# Patient Record
Sex: MALE | Race: WHITE
[De-identification: names, ages, dates, MRNs, and addresses within clinical notes are randomized per-mention and may not be internally consistent; named-entity substitution may affect disease eponyms.]

---

## 2018-06-02 ENCOUNTER — HOSPITAL ENCOUNTER (OUTPATIENT)
Dept: HOSPITAL 56 - MW.ED | Age: 83
Setting detail: OBSERVATION
LOS: 1 days | Discharge: HOME | End: 2018-06-03
Attending: INTERNAL MEDICINE | Admitting: INTERNAL MEDICINE
Payer: MEDICARE

## 2018-06-02 DIAGNOSIS — Z95.5: ICD-10-CM

## 2018-06-02 DIAGNOSIS — R07.9: Primary | ICD-10-CM

## 2018-06-02 DIAGNOSIS — Z85.828: ICD-10-CM

## 2018-06-02 DIAGNOSIS — Z79.82: ICD-10-CM

## 2018-06-02 DIAGNOSIS — E11.9: ICD-10-CM

## 2018-06-02 DIAGNOSIS — Z86.73: ICD-10-CM

## 2018-06-02 DIAGNOSIS — I25.10: ICD-10-CM

## 2018-06-02 DIAGNOSIS — Z88.8: ICD-10-CM

## 2018-06-02 DIAGNOSIS — Z79.02: ICD-10-CM

## 2018-06-02 DIAGNOSIS — F03.90: ICD-10-CM

## 2018-06-02 DIAGNOSIS — Z79.899: ICD-10-CM

## 2018-06-02 DIAGNOSIS — Z79.84: ICD-10-CM

## 2018-06-02 LAB
CHLORIDE SERPL-SCNC: 105 MMOL/L (ref 98–107)
SODIUM SERPL-SCNC: 141 MMOL/L (ref 136–148)

## 2018-06-02 PROCEDURE — 80048 BASIC METABOLIC PNL TOTAL CA: CPT

## 2018-06-02 PROCEDURE — G0378 HOSPITAL OBSERVATION PER HR: HCPCS

## 2018-06-02 PROCEDURE — 93005 ELECTROCARDIOGRAM TRACING: CPT

## 2018-06-02 PROCEDURE — 96361 HYDRATE IV INFUSION ADD-ON: CPT

## 2018-06-02 PROCEDURE — 82962 GLUCOSE BLOOD TEST: CPT

## 2018-06-02 PROCEDURE — 84484 ASSAY OF TROPONIN QUANT: CPT

## 2018-06-02 PROCEDURE — 80053 COMPREHEN METABOLIC PANEL: CPT

## 2018-06-02 PROCEDURE — 99285 EMERGENCY DEPT VISIT HI MDM: CPT

## 2018-06-02 PROCEDURE — 85025 COMPLETE CBC W/AUTO DIFF WBC: CPT

## 2018-06-02 PROCEDURE — 83690 ASSAY OF LIPASE: CPT

## 2018-06-02 PROCEDURE — 82550 ASSAY OF CK (CPK): CPT

## 2018-06-02 PROCEDURE — 36415 COLL VENOUS BLD VENIPUNCTURE: CPT

## 2018-06-02 PROCEDURE — 71045 X-RAY EXAM CHEST 1 VIEW: CPT

## 2018-06-02 PROCEDURE — 82553 CREATINE MB FRACTION: CPT

## 2018-06-02 PROCEDURE — 96374 THER/PROPH/DIAG INJ IV PUSH: CPT

## 2018-06-02 PROCEDURE — 81001 URINALYSIS AUTO W/SCOPE: CPT

## 2018-06-02 RX ADMIN — METOROPROLOL TARTRATE SCH MG: 5 INJECTION, SOLUTION INTRAVENOUS at 07:34

## 2018-06-02 RX ADMIN — METOROPROLOL TARTRATE SCH: 5 INJECTION, SOLUTION INTRAVENOUS at 09:00

## 2018-06-02 RX ADMIN — METOROPROLOL TARTRATE SCH MG: 5 INJECTION, SOLUTION INTRAVENOUS at 07:43

## 2018-06-02 NOTE — EDM.PDOC
ED HPI GENERAL MEDICAL PROBLEM





- General


Chief Complaint: Chest Pain


Stated Complaint: CHEST PAIN


Time Seen by Provider: 06/02/18 07:14


Source of Information: Reports: Patient





- History of Present Illness


INITIAL COMMENTS - FREE TEXT/NARRATIVE: 





HISTORY AND PHYSICAL:


History of present illness:


[Patient with history of Alzheimer's type dementia in the early stages of this 

presents with atypical chest pain which began at 4 AM and is currently resolved 

is uncertain if he had actual pain he describes a dentist his heart was beating 

heavy he did not associate any shortness of breath dizziness or diaphoresis 

with this event no radiation to arm neck or jaw





He does have history of previous stent in 2010





No fever nausea vomiting chills sweats no chest pain shortness breath headache 

dizziness or palpitation no bowel or urine symptoms at current


]


Review of systems: 


As per history of present illness and below otherwise all systems reviewed and 

negative.


Past medical history: 


As per history of present illness and as reviewed below otherwise 

noncontributory.


Surgical history: 


As per history of present illness and as reviewed below otherwise 

noncontributory.


Social history: 


No reported history of drug or alcohol abuse.


Family history: 


As per history of present illness and as reviewed below otherwise 

noncontributory.


Physical exam:


HEENT: Atraumatic, normocephalic, pupils reactive, negative for conjunctival 

pallor or scleral icterus, mucous membranes moist, throat clear, neck supple, 

nontender, trachea midline.


Lungs: Clear to auscultation, breath sounds equal bilaterally, chest nontender.


Heart: S1S2, regular, negative for clicks, rubs, or JVD.


Abdomen: Soft, nondistended, nontender. Negative for masses or 

hepatosplenomegaly. Negative for costovertebral tenderness.


Pelvis: Stable nontender.


Genitourinary: Deferred.


Rectal: Deferred.


Extremities: Atraumatic, negative for cords or calf pain. Neurovascular 

unremarkable.


Neuro: Awake, alert, oriented. Cranial nerves II through XII unremarkable. 

Cerebellum unremarkable. Motor and sensory unremarkable throughout. Exam 

nonfocal.





Diagnostics:


[CBC CMP and cardiac enzymes lipase


EKG


Chest 1 view


]


Therapeutics:


[ liter normal saline


Aspirin 324 mg chewable


] Lopressor 5 mg IV





Impression: 


[Atypical chest pain





Definitive disposition and diagnosis as appropriate pending reevaluation and 

review of above.





- Related Data


 Allergies











Allergy/AdvReac Type Severity Reaction Status Date / Time


 


chloramphenicol sod succ Allergy Unknown Hallucinati Verified 10/16/14 00:20





[From Chloromycetin]   ons  


 


chloramphenicol Allergy  Hallucinati Verified 07/16/14 10:44





[From Chloromycetin]   ons  











Home Meds: 


 Home Meds





Glimepiride 0.5 tab PO DAILY 06/02/18 [History]


Metoprolol Tartrate [Lopressor] 50 mg PO DAILY 06/02/18 [History]


atorvaSTATin [Lipitor] 10 mg PO DAILY 06/02/18 [History]


metFORMIN [Glucophage XR] 500 mg PO BIDMEALS 06/02/18 [History]











ED ROS GENERAL





- Review of Systems


Review Of Systems: See Below





ED EXAM, GENERAL





- Physical Exam


Exam: See Below





Course





- Vital Signs


Last Recorded V/S: 


 Last Vital Signs











Temp  97.9 F   06/02/18 07:14


 


Pulse  87   06/02/18 07:34


 


Resp  18   06/02/18 07:14


 


BP  188/92 H  06/02/18 07:34


 


Pulse Ox  93 L  06/02/18 07:14














- Orders/Labs/Meds


Orders: 


 Active Orders 24 hr











 Category Date Time Status


 


 EKG Documentation Completion [RC] STAT Care  06/02/18 07:15 Active


 


 Chest 1V Frontal [CR] Stat Exams  06/02/18 07:15 Ordered


 


 UA W/MICROSCOPIC [URIN] Stat Lab  06/02/18 07:14 Ordered


 


 Sodium Chloride 0.9% [Normal Saline] 1,000 ml Med  06/02/18 07:15 Active





 IV STAT   








 Medication Orders





Sodium Chloride (Normal Saline)  1,000 mls @ 125 mls/hr IV STAT AKI


   Last Infusion: 06/02/18 07:30  Dose: 50 mls/hr


   Admin: 06/02/18 07:28  Dose: 125 mls/hr








Labs: 


 Laboratory Tests











  06/02/18 06/02/18 06/02/18 Range/Units





  07:18 07:18 07:30 


 


WBC  11.25 H    (4.0-11.0)  K/uL


 


RBC  4.75    (4.50-5.90)  M/uL


 


Hgb  13.6    (13.0-17.0)  g/dL


 


Hct  41.3    (38.0-50.0)  %


 


MCV  86.9    (80.0-98.0)  fL


 


MCH  28.6    (27.0-32.0)  pg


 


MCHC  32.9    (31.0-37.0)  g/dL


 


RDW Std Deviation  46.0    (28.0-62.0)  fl


 


RDW Coeff of Donet  15    (11.0-15.0)  %


 


Plt Count  167    (150-400)  K/uL


 


MPV  10.40    (7.40-12.00)  fL


 


Neut % (Auto)  61.0    (48.0-80.0)  %


 


Lymph % (Auto)  31.3    (16.0-40.0)  %


 


Mono % (Auto)  6.5    (0.0-15.0)  %


 


Eos % (Auto)  1.1    (0.0-7.0)  %


 


Baso % (Auto)  0.1    (0.0-1.5)  %


 


Neut # (Auto)  6.9 H    (1.4-5.7)  K/uL


 


Lymph # (Auto)  3.5 H    (0.6-2.4)  K/uL


 


Mono # (Auto)  0.7    (0.0-0.8)  K/uL


 


Eos # (Auto)  0.1    (0.0-0.7)  K/uL


 


Baso # (Auto)  0.0    (0.0-0.1)  K/uL


 


Nucleated RBC %  0.0    /100WBC


 


Nucleated RBCs #  0    K/uL


 


Sodium   141   (136-148)  mmol/L


 


Potassium   3.8   (3.5-5.1)  mmol/L


 


Chloride   105   ()  mmol/L


 


Carbon Dioxide   25.2   (21.0-32.0)  mmol/L


 


BUN   27 H   (7.0-18.0)  mg/dL


 


Creatinine   1.1   (0.8-1.3)  mg/dL


 


Est Cr Clr Drug Dosing   42.56   mL/min


 


Estimated GFR (MDRD)   > 60.0   ml/min


 


Glucose   122 H   ()  mg/dL


 


POC Glucose    116 H  ()  mg/dL


 


Calcium   9.1   (8.5-10.1)  mg/dL


 


Total Bilirubin   0.6   (0.2-1.0)  mg/dL


 


AST   20   (15-37)  IU/L


 


ALT   24   (14-63)  IU/L


 


Alkaline Phosphatase   71   ()  U/L


 


Creatine Kinase   126   ()  U/L


 


CK-MB (CK-2)   4.3 H   (0-3.6)  ng/mL


 


Troponin I   < 0.050   (0.000-0.056)  ng/mL


 


Total Protein   7.0   (6.4-8.2)  g/dL


 


Albumin   3.7   (3.4-5.0)  g/dL


 


Globulin   3.3   (2.0-3.5)  g/dL


 


Albumin/Globulin Ratio   1.1 L   (1.3-2.8)  


 


Lipase   77   ()  U/L











Meds: 


Medications











Generic Name Dose Route Start Last Admin





  Trade Name Freq  PRN Reason Stop Dose Admin


 


Sodium Chloride  1,000 mls @ 125 mls/hr  06/02/18 07:15  06/02/18 07:30





  Normal Saline  IV   50 mls/hr





  STAT AKI   Infusion





     





     





     





     














Discontinued Medications














Generic Name Dose Route Start Last Admin





  Trade Name Sheltonq  PRN Reason Stop Dose Admin


 


Aspirin  324 mg  06/02/18 07:13  06/02/18 07:25





  Aspirin  PO  06/02/18 07:14  324 mg





  ONETIME ONE   Administration





     





     





     





     


 


Metoprolol Tartrate  5 mg  06/02/18 07:30  06/02/18 07:34





  Lopressor  IVPUSH  06/02/18 07:41  5 mg





  Q5M AKI   Administration





     





     





     





     














Departure





- Departure


Time of Disposition: 08:14


Disposition: Refer to Observation


Condition: Fair


Clinical Impression: 


 Atypical chest pain








- Discharge Information


Referrals: 


Brianna Dee, NP [Primary Care Provider] - 


Forms:  ED Department Discharge





- My Orders


Last 24 Hours: 


My Active Orders





06/02/18 07:14


UA W/MICROSCOPIC [URIN] Stat 





06/02/18 07:15


EKG Documentation Completion [RC] STAT 


Chest 1V Frontal [CR] Stat 


Sodium Chloride 0.9% [Normal Saline] 1,000 ml IV STAT 














- Assessment/Plan


Last 24 Hours: 


My Active Orders





06/02/18 07:14


UA W/MICROSCOPIC [URIN] Stat 





06/02/18 07:15


EKG Documentation Completion [RC] STAT 


Chest 1V Frontal [CR] Stat 


Sodium Chloride 0.9% [Normal Saline] 1,000 ml IV STAT

## 2018-06-02 NOTE — PCM.HP
H&P History of Present Illness





- General


Admit Problem/Dx: 


 Admission Diagnosis/Problem





Admission Diagnosis/Problem      Atypical chest pain











- History of Present Illness


Initial Comments - Free Text/Narative: 





89 yo male with pmh of dementia and CAD s/p one stent.  He presented this 

morning with complaint of heart palpitations.  He was evaluated in the ED and 

EKG showed sinur rhythm with rate of 98 bpm.  His blood pressure was 188/92.  

He did received ASA 325mg and IV Lopressor 5mg.  His chest complaint has 

resolved and due to his dementia he has already forgotten about it.  Family 

that is here with him do not want any agressive measures but are willing to 

keep him here overnight.





- Related Data


Allergies/Adverse Reactions: 


 Allergies











Allergy/AdvReac Type Severity Reaction Status Date / Time


 


chloramphenicol sod succ Allergy Unknown Hallucinati Verified 10/16/14 00:20





[From Chloromycetin]   ons  


 


chloramphenicol Allergy  Hallucinati Verified 07/16/14 10:44





[From Chloromycetin]   ons  











Home Medications: 


 Home Meds





Glimepiride 0.5 tab PO DAILY 06/02/18 [History]


Metoprolol Tartrate [Lopressor] 50 mg PO DAILY 06/02/18 [History]


atorvaSTATin [Lipitor] 10 mg PO DAILY 06/02/18 [History]


metFORMIN [Glucophage XR] 500 mg PO BIDMEALS 06/02/18 [History]











Past Medical History


HEENT History: Reports: Cataract


Cardiovascular History: Reports: Stents


Genitourinary History: Reports: Other (See Below)


Other Genitourinary History: prostate surgery over 20 years ago


Neurological History: Reports: TIA


Endocrine/Metabolic History: Reports: Diabetes, Type II


Oncologic (Cancer) History: Reports: Other (See Below)


Other Oncologic History: hx of skin CA on nose


Dermatologic History: Reports: Other (See Below)


Other Dermatologic History: hx of skin cancer on nose





- Infectious Disease History


Infectious Disease History: Reports: Chicken Pox





Social & Family History





- Family History


Family Medical History: Noncontributory





- Tobacco Use


Smoking Status *Q: Never Smoker


Second Hand Smoke Exposure: No





- Caffeine Use


Caffeine Use: Reports: Coffee, Soda





- Recreational Drug Use


Recreational Drug Use: No





H&P Review of Systems





- Review of Systems:


Review Of Systems: ROS reveals no pertinent complaints other than HPI.





Exam





- Exam


Exam: See Below





- Vital Signs


Vital Signs: 


 Last Vital Signs











Temp  37.0 C   06/02/18 12:30


 


Pulse  94   06/02/18 12:39


 


Resp  16   06/02/18 12:30


 


BP  165/101 H  06/02/18 12:39


 


Pulse Ox  94 L  06/02/18 12:30











Weight: 73 kg





- Exam


General: Alert, Oriented


Lungs: Clear to Auscultation, Normal Respiratory Effort


Cardiovascular: Regular Rate, Regular Rhythm


GI/Abdominal Exam: Soft, Non-Tender, No Distention


Extremities: Non-Tender, No Pedal Edema


Skin: Warm, Dry, Intact





- Patient Data


Lab Results Last 24 hrs: 


 Laboratory Results - last 24 hr











  06/02/18 06/02/18 06/02/18 Range/Units





  07:18 07:18 07:30 


 


WBC  11.25 H    (4.0-11.0)  K/uL


 


RBC  4.75    (4.50-5.90)  M/uL


 


Hgb  13.6    (13.0-17.0)  g/dL


 


Hct  41.3    (38.0-50.0)  %


 


MCV  86.9    (80.0-98.0)  fL


 


MCH  28.6    (27.0-32.0)  pg


 


MCHC  32.9    (31.0-37.0)  g/dL


 


RDW Std Deviation  46.0    (28.0-62.0)  fl


 


RDW Coeff of Donte  15    (11.0-15.0)  %


 


Plt Count  167    (150-400)  K/uL


 


MPV  10.40    (7.40-12.00)  fL


 


Neut % (Auto)  61.0    (48.0-80.0)  %


 


Lymph % (Auto)  31.3    (16.0-40.0)  %


 


Mono % (Auto)  6.5    (0.0-15.0)  %


 


Eos % (Auto)  1.1    (0.0-7.0)  %


 


Baso % (Auto)  0.1    (0.0-1.5)  %


 


Neut # (Auto)  6.9 H    (1.4-5.7)  K/uL


 


Lymph # (Auto)  3.5 H    (0.6-2.4)  K/uL


 


Mono # (Auto)  0.7    (0.0-0.8)  K/uL


 


Eos # (Auto)  0.1    (0.0-0.7)  K/uL


 


Baso # (Auto)  0.0    (0.0-0.1)  K/uL


 


Nucleated RBC %  0.0    /100WBC


 


Nucleated RBCs #  0    K/uL


 


Sodium   141   (136-148)  mmol/L


 


Potassium   3.8   (3.5-5.1)  mmol/L


 


Chloride   105   ()  mmol/L


 


Carbon Dioxide   25.2   (21.0-32.0)  mmol/L


 


BUN   27 H   (7.0-18.0)  mg/dL


 


Creatinine   1.1   (0.8-1.3)  mg/dL


 


Est Cr Clr Drug Dosing   42.56   mL/min


 


Estimated GFR (MDRD)   > 60.0   ml/min


 


Glucose   122 H   ()  mg/dL


 


POC Glucose    116 H  ()  mg/dL


 


Calcium   9.1   (8.5-10.1)  mg/dL


 


Total Bilirubin   0.6   (0.2-1.0)  mg/dL


 


AST   20   (15-37)  IU/L


 


ALT   24   (14-63)  IU/L


 


Alkaline Phosphatase   71   ()  U/L


 


Creatine Kinase   126   ()  U/L


 


CK-MB (CK-2)   4.3 H   (0-3.6)  ng/mL


 


Troponin I   < 0.050   (0.000-0.056)  ng/mL


 


Total Protein   7.0   (6.4-8.2)  g/dL


 


Albumin   3.7   (3.4-5.0)  g/dL


 


Globulin   3.3   (2.0-3.5)  g/dL


 


Albumin/Globulin Ratio   1.1 L   (1.3-2.8)  


 


Lipase   77   ()  U/L


 


Urine Color     


 


Urine Appearance     


 


Urine pH     (5.0-8.0)  


 


Ur Specific Gravity     (1.001-1.035)  


 


Urine Protein     (NEGATIVE)  mg/dL


 


Urine Glucose (UA)     (NEGATIVE)  mg/dL


 


Urine Ketones     (NEGATIVE)  mg/dL


 


Urine Occult Blood     (NEGATIVE)  


 


Urine Nitrite     (NEGATIVE)  


 


Urine Bilirubin     (NEGATIVE)  


 


Urine Urobilinogen     (<2.0)  EU/dL


 


Ur Leukocyte Esterase     (NEGATIVE)  


 


Urine RBC     (0-2/HPF)  


 


Urine WBC     (0-5/HPF)  


 


Ur Epithelial Cells     (NONE-FEW)  


 


Urine Bacteria     (NEGATIVE)  














  06/02/18 06/02/18 06/02/18 Range/Units





  08:33 12:00 12:38 


 


WBC     (4.0-11.0)  K/uL


 


RBC     (4.50-5.90)  M/uL


 


Hgb     (13.0-17.0)  g/dL


 


Hct     (38.0-50.0)  %


 


MCV     (80.0-98.0)  fL


 


MCH     (27.0-32.0)  pg


 


MCHC     (31.0-37.0)  g/dL


 


RDW Std Deviation     (28.0-62.0)  fl


 


RDW Coeff of Donte     (11.0-15.0)  %


 


Plt Count     (150-400)  K/uL


 


MPV     (7.40-12.00)  fL


 


Neut % (Auto)     (48.0-80.0)  %


 


Lymph % (Auto)     (16.0-40.0)  %


 


Mono % (Auto)     (0.0-15.0)  %


 


Eos % (Auto)     (0.0-7.0)  %


 


Baso % (Auto)     (0.0-1.5)  %


 


Neut # (Auto)     (1.4-5.7)  K/uL


 


Lymph # (Auto)     (0.6-2.4)  K/uL


 


Mono # (Auto)     (0.0-0.8)  K/uL


 


Eos # (Auto)     (0.0-0.7)  K/uL


 


Baso # (Auto)     (0.0-0.1)  K/uL


 


Nucleated RBC %     /100WBC


 


Nucleated RBCs #     K/uL


 


Sodium     (136-148)  mmol/L


 


Potassium     (3.5-5.1)  mmol/L


 


Chloride     ()  mmol/L


 


Carbon Dioxide     (21.0-32.0)  mmol/L


 


BUN     (7.0-18.0)  mg/dL


 


Creatinine     (0.8-1.3)  mg/dL


 


Est Cr Clr Drug Dosing     mL/min


 


Estimated GFR (MDRD)     ml/min


 


Glucose     ()  mg/dL


 


POC Glucose   136 H   ()  mg/dL


 


Calcium     (8.5-10.1)  mg/dL


 


Total Bilirubin     (0.2-1.0)  mg/dL


 


AST     (15-37)  IU/L


 


ALT     (14-63)  IU/L


 


Alkaline Phosphatase     ()  U/L


 


Creatine Kinase     ()  U/L


 


CK-MB (CK-2)     (0-3.6)  ng/mL


 


Troponin I    0.102 H*  (0.000-0.056)  ng/mL


 


Total Protein     (6.4-8.2)  g/dL


 


Albumin     (3.4-5.0)  g/dL


 


Globulin     (2.0-3.5)  g/dL


 


Albumin/Globulin Ratio     (1.3-2.8)  


 


Lipase     ()  U/L


 


Urine Color  YELLOW    


 


Urine Appearance  CLEAR    


 


Urine pH  5.5    (5.0-8.0)  


 


Ur Specific Gravity  1.015    (1.001-1.035)  


 


Urine Protein  30    (NEGATIVE)  mg/dL


 


Urine Glucose (UA)  NEGATIVE    (NEGATIVE)  mg/dL


 


Urine Ketones  NEGATIVE    (NEGATIVE)  mg/dL


 


Urine Occult Blood  TRACE-INTACT    (NEGATIVE)  


 


Urine Nitrite  NEGATIVE    (NEGATIVE)  


 


Urine Bilirubin  NEGATIVE    (NEGATIVE)  


 


Urine Urobilinogen  0.2    (<2.0)  EU/dL


 


Ur Leukocyte Esterase  NEGATIVE    (NEGATIVE)  


 


Urine RBC  0-3    (0-2/HPF)  


 


Urine WBC  0-2    (0-5/HPF)  


 


Ur Epithelial Cells  FEW    (NONE-FEW)  


 


Urine Bacteria  RARE    (NEGATIVE)  











Result Diagrams: 


 06/02/18 07:18





 06/02/18 07:18


Problem List Initiated/Reviewed/Updated: Yes


Orders Last 24hrs: 


 Active Orders 24 hr











 Category Date Time Status


 


 Admission Status [Patient Status] [ADT] Stat ADT  06/02/18 08:15 Active


 


 EKG Documentation Completion [RC] STAT Care  06/02/18 07:15 Active


 


 Telemetry Monitoring [Cardiac Monitoring] [RC] .AS Care  06/02/18 09:30 Active





 DIRECTED   


 


 [employer] Diet [DIET] Diet  06/02/18 Lunch Active


 


 Chest 1V Frontal [CR] Stat Exams  06/02/18 07:15 Taken


 


 TROPONIN I [CHEM] Q6H Lab  06/02/18 19:00 Ordered


 


 UA W/MICROSCOPIC [URIN] Stat Lab  06/02/18 08:33 Ordered


 


 Clopidogrel [Plavix] Med  06/02/18 13:40 Once





 300 mg PO ONETIME ONE   


 


 Clopidogrel [Plavix] Med  06/03/18 09:00 Ordered





 75 mg PO DAILY   


 


 Enoxaparin [Lovenox] Med  06/02/18 13:45 Ordered





 70 mg SUBCUT Q12H   


 


 Insulin Aspart [NovoLOG] Med  06/02/18 17:00 Active





 See Protocol  SUBCUT TIDAC   


 


 Metoprolol Tartrate [Lopressor] Med  06/02/18 11:45 Active





 50 mg PO DAILY   


 


 atorvaSTATin [Lipitor] Med  06/02/18 21:00 Ordered





 40 mg PO BEDTIME   








 Medication Orders





Clopidogrel Bisulfate (Plavix)  300 mg PO ONETIME ONE


   Stop: 06/02/18 13:41


Clopidogrel Bisulfate (Plavix)  75 mg PO DAILY [facility]


Enoxaparin Sodium (Lovenox)  70 mg SUBCUT Q12H AKI


Insulin Aspart (Novolog)  0 unit SUBCUT TIDAC [facility]; Protocol


Metoprolol Tartrate (Lopressor)  50 mg PO DAILY [facility]


   Last Admin: 06/02/18 12:39  Dose: 50 mg








Assessment/Plan Comment:: 





89 yo male who presents with chest pain.  His second set of troponin melody to 

.102.


NSTEMI/unstable angina: treating with ASA, Metoprolol, plavix and lovenox,  

family wants to avoid heparin drip due to his dementia.  Family is not wanting 

aggressive care and as patient does not like being in the hospital they may be 

considering discharge home tomorrow with continued palliative care.


DM: on sliding scale insulin.

## 2018-06-03 LAB
CHLORIDE SERPL-SCNC: 107 MMOL/L (ref 98–107)
SODIUM SERPL-SCNC: 142 MMOL/L (ref 136–148)

## 2018-06-03 NOTE — [id number]
**Discharge Summary





- Discharge Data


Discharge Date: 06/03/18


Discharge Disposition: Home, Self-Care 01


Condition: Good





- Patient Summary/Data


Consults: 


 Consultations





06/03/18 08:57


Consult to Home Health [CONS] Routine 











Hospital Course: 


89 yo male with pmh of dementia and CAD s/p one stent.  He presented with 

complaint of heart palpitations and chest discomfort.  He was evaluated in the 

ED and EKG showed sinus rhythm with rate of 98 bpm.  His blood pressure was 188/

92.  He did received ASA 325mg and IV Lopressor 5mg.  His chest complaint has 

resolved and due to his dementia he is unable to give details regarding it.  He 

was placed in observation and he did bump his troponin to as high as 1.69.  He 

was treated for possible NSTEMI/unstable angina with lovenox, ASA, and loaded 

with plavix.  His metoprolol was increased to twice a day and lisinopril was 

added for blood pressure control.  Overnight patient did have some agitation.  

Family is not wanting aggressive measures and want to take the patient home to 

avoid further agitation. He was discharged home and family will be staying with 

him and they may decided on [facility] placement at a later date.








- Patient Instructions


Diet: Heart Healthy Diet, Diabetic Diet





- Discharge Plan


Prescriptions/Med Rec: 


Aspirin [Ecotrin] 325 mg PO DAILY #30 tab.ec


atorvaSTATin [Lipitor] 40 mg PO BEDTIME #30 tablet


Clopidogrel [Plavix] 75 mg PO DAILY #30 tablet


Lisinopril 10 mg PO DAILY #30 tablet


Metoprolol Tartrate [Lopressor] 50 mg PO BID #60 tablet


Home Medications: 


 Home Meds





Glimepiride 0.5 tab PO DAILY 06/02/18 [History]


metFORMIN [Glucophage XR] 500 mg PO BIDMEALS 06/02/18 [History]


Aspirin [Ecotrin] 325 mg PO DAILY #30 tab.ec 06/03/18 [Rx]


Clopidogrel [Plavix] 75 mg PO DAILY #30 tablet 06/03/18 [Rx]


Lisinopril 10 mg PO DAILY #30 tablet 06/03/18 [Rx]


Metoprolol Tartrate [Lopressor] 50 mg PO BID #60 tablet 06/03/18 [Rx]


atorvaSTATin [Lipitor] 40 mg PO BEDTIME #30 tablet 06/03/18 [Rx]








Forms:  ED Department Discharge


Referrals: 


Brianna Dee NP [Primary Care Provider] - 





- Patient Data


Vitals - Most Recent: 


 Last Vital Signs











Temp  36.2 C   06/03/18 04:00


 


Pulse  64   06/03/18 08:55


 


Resp  15   06/03/18 04:00


 


BP  123/60   06/03/18 08:55


 


Pulse Ox  95   06/03/18 04:00











Weight - Most Recent: 73 kg


I&O - Last 24 hours: 


 Intake & Output











 06/02/18 06/03/18 06/03/18





 22:59 06:59 14:59


 


Intake Total 400 300 


 


Output Total 400 1000 


 


Balance 0 -700 











Lab Results - Last 24 hrs: 


 Laboratory Results - last 24 hr











  06/02/18 06/02/18 06/02/18 Range/Units





  12:00 12:38 16:41 


 


WBC     (4.0-11.0)  K/uL


 


RBC     (4.50-5.90)  M/uL


 


Hgb     (13.0-17.0)  g/dL


 


Hct     (38.0-50.0)  %


 


MCV     (80.0-98.0)  fL


 


MCH     (27.0-32.0)  pg


 


MCHC     (31.0-37.0)  g/dL


 


RDW Std Deviation     (28.0-62.0)  fl


 


RDW Coeff of Odnte     (11.0-15.0)  %


 


Plt Count     (150-400)  K/uL


 


MPV     (7.40-12.00)  fL


 


Neut % (Auto)     (48.0-80.0)  %


 


Lymph % (Auto)     (16.0-40.0)  %


 


Mono % (Auto)     (0.0-15.0)  %


 


Eos % (Auto)     (0.0-7.0)  %


 


Baso % (Auto)     (0.0-1.5)  %


 


Neut # (Auto)     (1.4-5.7)  K/uL


 


Lymph # (Auto)     (0.6-2.4)  K/uL


 


Mono # (Auto)     (0.0-0.8)  K/uL


 


Eos # (Auto)     (0.0-0.7)  K/uL


 


Baso # (Auto)     (0.0-0.1)  K/uL


 


Nucleated RBC %     /100WBC


 


Nucleated RBCs #     K/uL


 


Sodium     (136-148)  mmol/L


 


Potassium     (3.5-5.1)  mmol/L


 


Chloride     ()  mmol/L


 


Carbon Dioxide     (21.0-32.0)  mmol/L


 


BUN     (7.0-18.0)  mg/dL


 


Creatinine     (0.8-1.3)  mg/dL


 


Est Cr Clr Drug Dosing     mL/min


 


Estimated GFR (MDRD)     ml/min


 


Glucose     ()  mg/dL


 


POC Glucose  136 H   144 H  ()  mg/dL


 


Calcium     (8.5-10.1)  mg/dL


 


Troponin I   0.102 H*   (0.000-0.056)  ng/mL














  06/02/18 06/03/18 06/03/18 Range/Units





  19:02 06:05 07:20 


 


WBC    8.41  (4.0-11.0)  K/uL


 


RBC    4.24 L  (4.50-5.90)  M/uL


 


Hgb    12.1 L  (13.0-17.0)  g/dL


 


Hct    37.1 L  (38.0-50.0)  %


 


MCV    87.5  (80.0-98.0)  fL


 


MCH    28.5  (27.0-32.0)  pg


 


MCHC    32.6  (31.0-37.0)  g/dL


 


RDW Std Deviation    47.0  (28.0-62.0)  fl


 


RDW Coeff of Donte    15  (11.0-15.0)  %


 


Plt Count    157  (150-400)  K/uL


 


MPV    10.50  (7.40-12.00)  fL


 


Neut % (Auto)    72.8  (48.0-80.0)  %


 


Lymph % (Auto)    16.8  (16.0-40.0)  %


 


Mono % (Auto)    8.9  (0.0-15.0)  %


 


Eos % (Auto)    1.4  (0.0-7.0)  %


 


Baso % (Auto)    0.1  (0.0-1.5)  %


 


Neut # (Auto)    6.1 H  (1.4-5.7)  K/uL


 


Lymph # (Auto)    1.4  (0.6-2.4)  K/uL


 


Mono # (Auto)    0.8  (0.0-0.8)  K/uL


 


Eos # (Auto)    0.1  (0.0-0.7)  K/uL


 


Baso # (Auto)    0.0  (0.0-0.1)  K/uL


 


Nucleated RBC %    0.0  /100WBC


 


Nucleated RBCs #    0  K/uL


 


Sodium     (136-148)  mmol/L


 


Potassium     (3.5-5.1)  mmol/L


 


Chloride     ()  mmol/L


 


Carbon Dioxide     (21.0-32.0)  mmol/L


 


BUN     (7.0-18.0)  mg/dL


 


Creatinine     (0.8-1.3)  mg/dL


 


Est Cr Clr Drug Dosing     mL/min


 


Estimated GFR (MDRD)     ml/min


 


Glucose     ()  mg/dL


 


POC Glucose   125 H   ()  mg/dL


 


Calcium     (8.5-10.1)  mg/dL


 


Troponin I  0.169 H*    (0.000-0.056)  ng/mL














  06/03/18 Range/Units





  07:20 


 


WBC   (4.0-11.0)  K/uL


 


RBC   (4.50-5.90)  M/uL


 


Hgb   (13.0-17.0)  g/dL


 


Hct   (38.0-50.0)  %


 


MCV   (80.0-98.0)  fL


 


MCH   (27.0-32.0)  pg


 


MCHC   (31.0-37.0)  g/dL


 


RDW Std Deviation   (28.0-62.0)  fl


 


RDW Coeff of Donte   (11.0-15.0)  %


 


Plt Count   (150-400)  K/uL


 


MPV   (7.40-12.00)  fL


 


Neut % (Auto)   (48.0-80.0)  %


 


Lymph % (Auto)   (16.0-40.0)  %


 


Mono % (Auto)   (0.0-15.0)  %


 


Eos % (Auto)   (0.0-7.0)  %


 


Baso % (Auto)   (0.0-1.5)  %


 


Neut # (Auto)   (1.4-5.7)  K/uL


 


Lymph # (Auto)   (0.6-2.4)  K/uL


 


Mono # (Auto)   (0.0-0.8)  K/uL


 


Eos # (Auto)   (0.0-0.7)  K/uL


 


Baso # (Auto)   (0.0-0.1)  K/uL


 


Nucleated RBC %   /100WBC


 


Nucleated RBCs #   K/uL


 


Sodium  142  (136-148)  mmol/L


 


Potassium  3.8  (3.5-5.1)  mmol/L


 


Chloride  107  ()  mmol/L


 


Carbon Dioxide  30.8  (21.0-32.0)  mmol/L


 


BUN  33 H  (7.0-18.0)  mg/dL


 


Creatinine  1.2  (0.8-1.3)  mg/dL


 


Est Cr Clr Drug Dosing  40.38  mL/min


 


Estimated GFR (MDRD)  57.0  ml/min


 


Glucose  122 H  ()  mg/dL


 


POC Glucose   ()  mg/dL


 


Calcium  8.5  (8.5-10.1)  mg/dL


 


Troponin I  0.126 H*  (0.000-0.056)  ng/mL











Med Orders - Current: 


 Current Medications





Atorvastatin Calcium (Lipitor)  40 mg PO BEDTIME [facility]


   Last Admin: 06/02/18 21:14 Dose:  40 mg


Clopidogrel Bisulfate (Plavix)  75 mg PO DAILY [facility]


   Last Admin: 06/03/18 08:54 Dose:  75 mg


Enoxaparin Sodium (Lovenox)  70 mg SUBCUT Q12H [facility]


   Last Admin: 06/03/18 01:18 Dose:  70 mg


Insulin Aspart (Novolog)  0 unit SUBCUT TIDAC [facility]; Protocol


   Last Admin: 06/03/18 06:30 Dose:  Not Given


Metoprolol Tartrate (Lopressor)  50 mg PO BIDMEALS [facility]


   Last Admin: 06/03/18 08:55 Dose:  50 mg





Discontinued Medications





Aspirin (Aspirin)  324 mg PO ONETIME ONE


   Stop: 06/02/18 07:14


   Last Admin: 06/02/18 07:25 Dose:  324 mg


Clopidogrel Bisulfate (Plavix)  300 mg PO ONETIME ONE


   Stop: 06/02/18 13:41


   Last Admin: 06/02/18 13:58 Dose:  300 mg


Sodium Chloride (Normal Saline)  1,000 mls @ 125 mls/hr IV STAT [facility]


   Last Infusion: 06/02/18 07:30 Dose:  50 mls/hr


Lisinopril (Prinivil)  10 mg PO ONETIME ONE


   Stop: 06/02/18 18:44


   Last Admin: 06/02/18 19:35 Dose:  10 mg


Metoprolol Tartrate (Lopressor)  5 mg IVPUSH Q5M [facility]


   Stop: 06/02/18 07:41


   Last Admin: 06/02/18 09:00 Dose:  Not Given


Metoprolol Tartrate (Lopressor)  50 mg PO DAILY [facility]


   Last Admin: 06/02/18 12:39 Dose:  50 mg


Pneumococcal Polyvalent Vaccine (Pneumovax 23)  0.5 ml IM .ONCE ONE


   Stop: 06/02/18 09:24

## 2018-06-04 NOTE — CR
EXAM DATE: 18



PATIENT'S AGE: 89





Patient: NESTOR TORRES



Facility: [place], [place]

Patient ID: [id number]

Site Patient ID: [id number].

Site Accession #: [id number]

: 1929

Study: XRay Chest [id number]/2/2018 12:23:06 PM

Ordering Physician: Doctor Temp



Final Report: 

INDICATION:

Pain. Shortness of breath.



COMPARISON:

None.



FINDINGS:

A portable AP view of the chest was obtained. The cardiac silhouette is 
enlarged. The pulmonary vasculature is within normal limits. There is 
atelectasis in the right lung base. There is a faint infiltrate in the right 
upper lung. The left lung is clear. 



IMPRESSION:

1. Faint infiltrate right upper lung.

2. Left lung base atelectasis.



Dictated by Mika Gramajo MD @ 2018 12:47:20 PM







Dictated by: Mika Gramajo MD @ 2018 12:47:30

(Electronic Signature)





Report Signed by Proxy.
MURTAZA